# Patient Record
Sex: MALE
[De-identification: names, ages, dates, MRNs, and addresses within clinical notes are randomized per-mention and may not be internally consistent; named-entity substitution may affect disease eponyms.]

---

## 2017-09-20 ENCOUNTER — APPOINTMENT (OUTPATIENT)
Dept: OTOLARYNGOLOGY | Facility: CLINIC | Age: 57
End: 2017-09-20
Payer: COMMERCIAL

## 2017-09-20 VITALS — WEIGHT: 199 LBS | BODY MASS INDEX: 30.16 KG/M2 | HEIGHT: 68 IN

## 2017-09-20 VITALS — SYSTOLIC BLOOD PRESSURE: 153 MMHG | HEART RATE: 81 BPM | OXYGEN SATURATION: 99 % | DIASTOLIC BLOOD PRESSURE: 108 MMHG

## 2017-09-20 DIAGNOSIS — Z87.891 PERSONAL HISTORY OF NICOTINE DEPENDENCE: ICD-10-CM

## 2017-09-20 DIAGNOSIS — R42 DIZZINESS AND GIDDINESS: ICD-10-CM

## 2017-09-20 PROBLEM — Z00.00 ENCOUNTER FOR PREVENTIVE HEALTH EXAMINATION: Status: ACTIVE | Noted: 2017-09-20

## 2017-09-20 PROCEDURE — 92550 TYMPANOMETRY & REFLEX THRESH: CPT

## 2017-09-20 PROCEDURE — 92557 COMPREHENSIVE HEARING TEST: CPT

## 2017-09-20 PROCEDURE — 99203 OFFICE O/P NEW LOW 30 MIN: CPT

## 2017-10-02 ENCOUNTER — APPOINTMENT (OUTPATIENT)
Dept: OTOLARYNGOLOGY | Facility: CLINIC | Age: 57
End: 2017-10-02
Payer: COMMERCIAL

## 2017-10-02 DIAGNOSIS — R42 DIZZINESS AND GIDDINESS: ICD-10-CM

## 2017-10-02 PROCEDURE — 92537 CALORIC VSTBLR TEST W/REC: CPT

## 2017-10-02 PROCEDURE — 92585: CPT

## 2017-10-02 PROCEDURE — 92584 ELECTROCOCHLEOGRAPHY: CPT

## 2017-10-02 PROCEDURE — 99213 OFFICE O/P EST LOW 20 MIN: CPT

## 2017-10-02 PROCEDURE — 92540 BASIC VESTIBULAR EVALUATION: CPT

## 2017-10-02 RX ORDER — MECLIZINE HYDROCHLORIDE 25 MG/1
25 TABLET ORAL
Qty: 60 | Refills: 2 | Status: ACTIVE | COMMUNITY
Start: 2017-10-02 | End: 1900-01-01

## 2025-03-31 ENCOUNTER — APPOINTMENT (OUTPATIENT)
Dept: HEART AND VASCULAR | Facility: CLINIC | Age: 65
End: 2025-03-31
Payer: COMMERCIAL

## 2025-03-31 ENCOUNTER — NON-APPOINTMENT (OUTPATIENT)
Age: 65
End: 2025-03-31

## 2025-03-31 VITALS
HEART RATE: 78 BPM | BODY MASS INDEX: 29.4 KG/M2 | SYSTOLIC BLOOD PRESSURE: 160 MMHG | HEIGHT: 68 IN | OXYGEN SATURATION: 97 % | DIASTOLIC BLOOD PRESSURE: 88 MMHG | WEIGHT: 194 LBS | TEMPERATURE: 97.2 F

## 2025-03-31 VITALS — SYSTOLIC BLOOD PRESSURE: 136 MMHG | DIASTOLIC BLOOD PRESSURE: 88 MMHG

## 2025-03-31 DIAGNOSIS — E78.49 OTHER HYPERLIPIDEMIA: ICD-10-CM

## 2025-03-31 DIAGNOSIS — R07.89 OTHER CHEST PAIN: ICD-10-CM

## 2025-03-31 DIAGNOSIS — I10 ESSENTIAL (PRIMARY) HYPERTENSION: ICD-10-CM

## 2025-03-31 DIAGNOSIS — R73.03 PREDIABETES.: ICD-10-CM

## 2025-03-31 DIAGNOSIS — R06.09 OTHER FORMS OF DYSPNEA: ICD-10-CM

## 2025-03-31 PROCEDURE — 93000 ELECTROCARDIOGRAM COMPLETE: CPT

## 2025-03-31 PROCEDURE — 99204 OFFICE O/P NEW MOD 45 MIN: CPT | Mod: 25

## 2025-03-31 RX ORDER — ESOMEPRAZOLE MAGNESIUM 40 MG/1
40 CAPSULE, DELAYED RELEASE ORAL
Refills: 0 | Status: ACTIVE | COMMUNITY

## 2025-03-31 RX ORDER — UBIDECARENONE/VIT E ACET 100MG-5
CAPSULE ORAL
Refills: 0 | Status: ACTIVE | COMMUNITY

## 2025-03-31 RX ORDER — ATORVASTATIN CALCIUM 20 MG/1
20 TABLET, FILM COATED ORAL
Refills: 0 | Status: ACTIVE | COMMUNITY

## 2025-03-31 RX ORDER — AMLODIPINE BESYLATE 5 MG/1
5 TABLET ORAL
Refills: 0 | Status: ACTIVE | COMMUNITY

## 2025-04-28 ENCOUNTER — RESULT REVIEW (OUTPATIENT)
Age: 65
End: 2025-04-28

## 2025-04-30 RX ORDER — CLOPIDOGREL BISULFATE 75 MG/1
75 TABLET, FILM COATED ORAL
Qty: 90 | Refills: 3 | Status: ACTIVE | COMMUNITY
Start: 2025-04-30 | End: 1900-01-01

## 2025-04-30 RX ORDER — ASPIRIN 81 MG/1
81 TABLET, DELAYED RELEASE ORAL
Qty: 90 | Refills: 3 | Status: ACTIVE | COMMUNITY
Start: 2025-04-30 | End: 1900-01-01

## 2025-05-01 ENCOUNTER — RESULT REVIEW (OUTPATIENT)
Age: 65
End: 2025-05-01

## 2025-05-02 VITALS
TEMPERATURE: 98 F | DIASTOLIC BLOOD PRESSURE: 80 MMHG | RESPIRATION RATE: 16 BRPM | SYSTOLIC BLOOD PRESSURE: 158 MMHG | WEIGHT: 190.92 LBS | OXYGEN SATURATION: 97 % | HEART RATE: 92 BPM | HEIGHT: 68 IN

## 2025-05-02 NOTE — H&P ADULT - HISTORY OF PRESENT ILLNESS
Cardiologist : Dr. Montez  Pharmacy :   Escort :    64 y o m former smoker with PMH of vertigo, HTN, HLD, prediabetes presented to his cardiologist c/o MAYER on mild exertion and CP at rest lasting seconds to minutes relived with rest with accompanied dizziness.   Pt denies syncope, palpitations, PND/orthopnea, LE edema, n/v, fever/chills, blood in the stool.   CCTA done on 4/30/25 showed Calcium score is severe at 741 A, probably severe stenosis in mRCA, moderate stenosis in prox LAD and OM1, mild to moderate stenosis in mLcx.   In light of pt's risk factors, CCS 3-4 anginal symptoms and abnormal CCTA, pt is now referred to [facility] for recommended cardiac cath with possible intervention.      Cardiologist : Dr. Montez  Pharmacy : [facility]   Escort : Son in Law    65 yo M, former heavy smoker, with a PMH HTN, HLD, prediabetes, GERd and vertigo who presented to his OP cardiologist with c/o an intermittent L sided chest pain at rest with radiation up the L neck (numbness) and b/l 1st/2nd digit numbness, worsening over the past year. Additionally endorses palpitations. Denies SOB, dizziness, diaphoresis, orthopnea/PND, BRBPR, hematuria, melena, LE swelling. Pt decided to have symptoms evaluatied after son recently  last year of cardiac arrest (unknown origin - was in 40s). CCTA 25: Calcium score is severe at 741 A, probably severe stenosis in mRCA, moderate stenosis in prox LAD and OM1, mild to moderate stenosis in mLcx. In light of pt's risk factors, CCS IV anginal symptoms and abnormal CCTA, pt is now referred to [facility] for recommended cardiac cath with possible intervention.

## 2025-05-02 NOTE — H&P ADULT - NS ATTEND AMEND GEN_ALL_CORE FT
63 yo M, former heavy smoker, with a PMH HTN, HLD, prediabetes, GERd and vertigo who presented to his OP cardiologist with c/o an intermittent L sided chest pain at rest with radiation up the L neck (numbness) and b/l 1st/2nd digit numbness, worsening over the past year. Additionally endorses palpitations. Denies SOB, dizziness, diaphoresis, orthopnea/PND, BRBPR, hematuria, melena, LE swelling. Pt decided to have symptoms evaluatied after son recently  last year of cardiac arrest (unknown origin - was in 40s). CCTA 25: Calcium score is severe at 741 A, probably severe stenosis in mRCA, moderate stenosis in prox LAD and OM1, mild to moderate stenosis in mLcx. In light of pt's risk factors, CCS IV anginal symptoms and abnormal CCTA, pt is now referred to [facility] for recommended cardiac cath with possible intervention.

## 2025-05-02 NOTE — H&P ADULT - NSICDXPASTSURGICALHX_GEN_ALL_CORE_FT
PAST SURGICAL HISTORY:  History of back surgery      PAST SURGICAL HISTORY:  History of appendectomy     History of back surgery

## 2025-05-02 NOTE — H&P ADULT - ASSESSMENT
63 yo M, former heavy smoker, with a PMH HTN, HLD, prediabetes, GERd and vertigo who presented to his OP cardiologist with c/o an intermittent L sided chest pain at rest with radiation up the L neck (numbness) and b/l 1st/2nd digit numbness, worsening over the past year. Additionally endorses palpitations. Denies SOB, dizziness, diaphoresis, orthopnea/PND, BRBPR, hematuria, melena, LE swelling. Pt decided to have symptoms evaluatied after son recently  last year of cardiac arrest (unknown origin - was in 40s). CCTA 25: Calcium score is severe at 741 A, probably severe stenosis in mRCA, moderate stenosis in prox LAD and OM1, mild to moderate stenosis in mLcx. In light of pt's risk factors, CCS IV anginal symptoms and abnormal CCTA, pt is now referred to [facility] for recommended cardiac cath with possible intervention.     -H/H (IStat) stable. Pt denies BRBPR, hematuria, hematochezia, melena. Pt compliant with home ASA/Plavix and took today. No additional load  -Cr (IStat) 1.07.  EF unknown. Euvolemic on exam. IVF with 250cc bolus x1 followed by NS @ 75cc/hr started pre procedure per protocol  -Type of sedation: moderate  -Candidate for sedation: yes     Risks & benefits of procedure and alternative therapy have been explained to the patient including but not limited to: allergic reaction, bleeding w/possible need for blood transfusion, infection, renal and vascular compromise, limb damage, arrhythmia, stroke, vessel dissection/perforation, Myocardial infarction, emergent CABG. Informed consent obtained and in chart.

## 2025-05-06 ENCOUNTER — INPATIENT (INPATIENT)
Facility: HOSPITAL | Age: 65
LOS: 0 days | Discharge: HOME | End: 2025-05-07
Attending: INTERNAL MEDICINE | Admitting: INTERNAL MEDICINE
Payer: COMMERCIAL

## 2025-05-06 DIAGNOSIS — Z98.890 OTHER SPECIFIED POSTPROCEDURAL STATES: Chronic | ICD-10-CM

## 2025-05-06 LAB
A1C WITH ESTIMATED AVERAGE GLUCOSE RESULT: 6.2 % — HIGH (ref 4–5.6)
ADD ON TEST-SPECIMEN IN LAB: SIGNIFICANT CHANGE UP
APTT BLD: 28.4 SEC — SIGNIFICANT CHANGE UP (ref 26.1–36.8)
BASOPHILS # BLD AUTO: 0.07 K/UL — SIGNIFICANT CHANGE UP (ref 0–0.2)
BASOPHILS NFR BLD AUTO: 0.9 % — SIGNIFICANT CHANGE UP (ref 0–2)
CK MB CFR SERPL CALC: 2.9 NG/ML — SIGNIFICANT CHANGE UP (ref 0–6.7)
CK SERPL-CCNC: 145 U/L — SIGNIFICANT CHANGE UP (ref 30–200)
EOSINOPHIL # BLD AUTO: 0.35 K/UL — SIGNIFICANT CHANGE UP (ref 0–0.5)
EOSINOPHIL NFR BLD AUTO: 4.4 % — SIGNIFICANT CHANGE UP (ref 0–6)
ESTIMATED AVERAGE GLUCOSE: 131 MG/DL — HIGH (ref 68–114)
HCT VFR BLD CALC: 45.8 % — SIGNIFICANT CHANGE UP (ref 39–50)
HGB BLD-MCNC: 15.5 G/DL — SIGNIFICANT CHANGE UP (ref 13–17)
IMM GRANULOCYTES NFR BLD AUTO: 1.4 % — HIGH (ref 0–0.9)
INR BLD: 0.96 — SIGNIFICANT CHANGE UP (ref 0.85–1.16)
LYMPHOCYTES # BLD AUTO: 3 K/UL — SIGNIFICANT CHANGE UP (ref 1–3.3)
LYMPHOCYTES # BLD AUTO: 37.5 % — SIGNIFICANT CHANGE UP (ref 13–44)
MCHC RBC-ENTMCNC: 30.6 PG — SIGNIFICANT CHANGE UP (ref 27–34)
MCHC RBC-ENTMCNC: 33.8 G/DL — SIGNIFICANT CHANGE UP (ref 32–36)
MCV RBC AUTO: 90.3 FL — SIGNIFICANT CHANGE UP (ref 80–100)
MONOCYTES # BLD AUTO: 0.69 K/UL — SIGNIFICANT CHANGE UP (ref 0–0.9)
MONOCYTES NFR BLD AUTO: 8.6 % — SIGNIFICANT CHANGE UP (ref 2–14)
NEUTROPHILS # BLD AUTO: 3.79 K/UL — SIGNIFICANT CHANGE UP (ref 1.8–7.4)
NEUTROPHILS NFR BLD AUTO: 47.2 % — SIGNIFICANT CHANGE UP (ref 43–77)
NRBC BLD AUTO-RTO: 0 /100 WBCS — SIGNIFICANT CHANGE UP (ref 0–0)
PLATELET # BLD AUTO: 209 K/UL — SIGNIFICANT CHANGE UP (ref 150–400)
PROTHROM AB SERPL-ACNC: 11.1 SEC — SIGNIFICANT CHANGE UP (ref 9.9–13.4)
RBC # BLD: 5.07 M/UL — SIGNIFICANT CHANGE UP (ref 4.2–5.8)
RBC # FLD: 13.2 % — SIGNIFICANT CHANGE UP (ref 10.3–14.5)
WBC # BLD: 8.01 K/UL — SIGNIFICANT CHANGE UP (ref 3.8–10.5)
WBC # FLD AUTO: 8.01 K/UL — SIGNIFICANT CHANGE UP (ref 3.8–10.5)

## 2025-05-06 PROCEDURE — 92928 PRQ TCAT PLMT NTRAC ST 1 LES: CPT | Mod: RC

## 2025-05-06 PROCEDURE — 93458 L HRT ARTERY/VENTRICLE ANGIO: CPT | Mod: 26,59

## 2025-05-06 PROCEDURE — 93010 ELECTROCARDIOGRAM REPORT: CPT | Mod: 76

## 2025-05-06 PROCEDURE — 99152 MOD SED SAME PHYS/QHP 5/>YRS: CPT

## 2025-05-06 PROCEDURE — 92978 ENDOLUMINL IVUS OCT C 1ST: CPT | Mod: 26,RC

## 2025-05-06 RX ORDER — ESOMEPRAZOLE MAGNESIUM 40 MG/1
1 CAPSULE, DELAYED RELEASE ORAL
Refills: 0 | DISCHARGE

## 2025-05-06 RX ORDER — AMLODIPINE BESYLATE 10 MG/1
5 TABLET ORAL DAILY
Refills: 0 | Status: DISCONTINUED | OUTPATIENT
Start: 2025-05-06 | End: 2025-05-07

## 2025-05-06 RX ORDER — CLOPIDOGREL BISULFATE 75 MG/1
600 TABLET, FILM COATED ORAL ONCE
Refills: 0 | Status: DISCONTINUED | OUTPATIENT
Start: 2025-05-06 | End: 2025-05-06

## 2025-05-06 RX ORDER — MECLIZINE HCL 12.5 MG
1 TABLET ORAL
Refills: 0 | DISCHARGE

## 2025-05-06 RX ORDER — AMLODIPINE BESYLATE 10 MG/1
1 TABLET ORAL
Refills: 0 | DISCHARGE

## 2025-05-06 RX ORDER — CLOPIDOGREL BISULFATE 75 MG/1
75 TABLET, FILM COATED ORAL DAILY
Refills: 0 | Status: DISCONTINUED | OUTPATIENT
Start: 2025-05-07 | End: 2025-05-07

## 2025-05-06 RX ORDER — ASPIRIN 325 MG
81 TABLET ORAL DAILY
Refills: 0 | Status: DISCONTINUED | OUTPATIENT
Start: 2025-05-07 | End: 2025-05-07

## 2025-05-06 RX ORDER — ATORVASTATIN CALCIUM 80 MG/1
40 TABLET, FILM COATED ORAL AT BEDTIME
Refills: 0 | Status: DISCONTINUED | OUTPATIENT
Start: 2025-05-06 | End: 2025-05-07

## 2025-05-06 RX ORDER — ASPIRIN 325 MG
325 TABLET ORAL ONCE
Refills: 0 | Status: DISCONTINUED | OUTPATIENT
Start: 2025-05-06 | End: 2025-05-06

## 2025-05-06 RX ADMIN — Medication 75 MILLILITER(S): at 07:52

## 2025-05-06 RX ADMIN — ATORVASTATIN CALCIUM 40 MILLIGRAM(S): 80 TABLET, FILM COATED ORAL at 19:44

## 2025-05-06 RX ADMIN — AMLODIPINE BESYLATE 5 MILLIGRAM(S): 10 TABLET ORAL at 19:39

## 2025-05-06 RX ADMIN — Medication 250 MILLILITER(S): at 07:52

## 2025-05-06 NOTE — PATIENT PROFILE ADULT - FALL HARM RISK - HARM RISK INTERVENTIONS

## 2025-05-07 ENCOUNTER — TRANSCRIPTION ENCOUNTER (OUTPATIENT)
Age: 65
End: 2025-05-07

## 2025-05-07 ENCOUNTER — NON-APPOINTMENT (OUTPATIENT)
Age: 65
End: 2025-05-07

## 2025-05-07 VITALS
RESPIRATION RATE: 18 BRPM | TEMPERATURE: 99 F | SYSTOLIC BLOOD PRESSURE: 131 MMHG | HEART RATE: 78 BPM | DIASTOLIC BLOOD PRESSURE: 86 MMHG | OXYGEN SATURATION: 99 %

## 2025-05-07 PROBLEM — R42 DIZZINESS AND GIDDINESS: Chronic | Status: ACTIVE | Noted: 2025-05-02

## 2025-05-07 PROBLEM — I10 ESSENTIAL (PRIMARY) HYPERTENSION: Chronic | Status: ACTIVE | Noted: 2025-05-02

## 2025-05-07 LAB
ANION GAP SERPL CALC-SCNC: 12 MMOL/L — SIGNIFICANT CHANGE UP (ref 5–17)
BUN SERPL-MCNC: 14 MG/DL — SIGNIFICANT CHANGE UP (ref 7–23)
CALCIUM SERPL-MCNC: 10 MG/DL — SIGNIFICANT CHANGE UP (ref 8.4–10.5)
CHLORIDE SERPL-SCNC: 104 MMOL/L — SIGNIFICANT CHANGE UP (ref 96–108)
CO2 SERPL-SCNC: 21 MMOL/L — LOW (ref 22–31)
CREAT SERPL-MCNC: 0.97 MG/DL — SIGNIFICANT CHANGE UP (ref 0.5–1.3)
EGFR: 86 ML/MIN/1.73M2 — SIGNIFICANT CHANGE UP
EGFR: 86 ML/MIN/1.73M2 — SIGNIFICANT CHANGE UP
GLUCOSE SERPL-MCNC: 125 MG/DL — HIGH (ref 70–99)
HCT VFR BLD CALC: 43.9 % — SIGNIFICANT CHANGE UP (ref 39–50)
HGB BLD-MCNC: 15.2 G/DL — SIGNIFICANT CHANGE UP (ref 13–17)
MAGNESIUM SERPL-MCNC: 2.2 MG/DL — SIGNIFICANT CHANGE UP (ref 1.6–2.6)
MCHC RBC-ENTMCNC: 30.9 PG — SIGNIFICANT CHANGE UP (ref 27–34)
MCHC RBC-ENTMCNC: 34.6 G/DL — SIGNIFICANT CHANGE UP (ref 32–36)
MCV RBC AUTO: 89.2 FL — SIGNIFICANT CHANGE UP (ref 80–100)
NRBC BLD AUTO-RTO: 0 /100 WBCS — SIGNIFICANT CHANGE UP (ref 0–0)
PLATELET # BLD AUTO: 188 K/UL — SIGNIFICANT CHANGE UP (ref 150–400)
POTASSIUM SERPL-MCNC: 4.1 MMOL/L — SIGNIFICANT CHANGE UP (ref 3.5–5.3)
POTASSIUM SERPL-SCNC: 4.1 MMOL/L — SIGNIFICANT CHANGE UP (ref 3.5–5.3)
RBC # BLD: 4.92 M/UL — SIGNIFICANT CHANGE UP (ref 4.2–5.8)
RBC # FLD: 13 % — SIGNIFICANT CHANGE UP (ref 10.3–14.5)
SODIUM SERPL-SCNC: 137 MMOL/L — SIGNIFICANT CHANGE UP (ref 135–145)
WBC # BLD: 8.43 K/UL — SIGNIFICANT CHANGE UP (ref 3.8–10.5)
WBC # FLD AUTO: 8.43 K/UL — SIGNIFICANT CHANGE UP (ref 3.8–10.5)

## 2025-05-07 PROCEDURE — 82553 CREATINE MB FRACTION: CPT

## 2025-05-07 PROCEDURE — 83036 HEMOGLOBIN GLYCOSYLATED A1C: CPT

## 2025-05-07 PROCEDURE — C1769: CPT

## 2025-05-07 PROCEDURE — 85730 THROMBOPLASTIN TIME PARTIAL: CPT

## 2025-05-07 PROCEDURE — C1874: CPT

## 2025-05-07 PROCEDURE — 82803 BLOOD GASES ANY COMBINATION: CPT

## 2025-05-07 PROCEDURE — C1887: CPT

## 2025-05-07 PROCEDURE — 83735 ASSAY OF MAGNESIUM: CPT

## 2025-05-07 PROCEDURE — C1894: CPT

## 2025-05-07 PROCEDURE — C1725: CPT

## 2025-05-07 PROCEDURE — 85027 COMPLETE CBC AUTOMATED: CPT

## 2025-05-07 PROCEDURE — 99239 HOSP IP/OBS DSCHRG MGMT >30: CPT

## 2025-05-07 PROCEDURE — 85610 PROTHROMBIN TIME: CPT

## 2025-05-07 PROCEDURE — C1753: CPT

## 2025-05-07 PROCEDURE — 93010 ELECTROCARDIOGRAM REPORT: CPT

## 2025-05-07 PROCEDURE — 80061 LIPID PANEL: CPT

## 2025-05-07 PROCEDURE — 85025 COMPLETE CBC W/AUTO DIFF WBC: CPT

## 2025-05-07 PROCEDURE — 80048 BASIC METABOLIC PNL TOTAL CA: CPT

## 2025-05-07 PROCEDURE — 82550 ASSAY OF CK (CPK): CPT

## 2025-05-07 PROCEDURE — 80053 COMPREHEN METABOLIC PANEL: CPT

## 2025-05-07 PROCEDURE — 85347 COAGULATION TIME ACTIVATED: CPT

## 2025-05-07 PROCEDURE — 36415 COLL VENOUS BLD VENIPUNCTURE: CPT

## 2025-05-07 PROCEDURE — C1760: CPT

## 2025-05-07 PROCEDURE — 93005 ELECTROCARDIOGRAM TRACING: CPT

## 2025-05-07 RX ORDER — CLOPIDOGREL BISULFATE 75 MG/1
1 TABLET, FILM COATED ORAL
Refills: 0 | DISCHARGE

## 2025-05-07 RX ORDER — ASPIRIN 325 MG
1 TABLET ORAL
Refills: 0 | DISCHARGE

## 2025-05-07 RX ORDER — ASPIRIN 325 MG
1 TABLET ORAL
Qty: 30 | Refills: 11
Start: 2025-05-07 | End: 2026-05-01

## 2025-05-07 RX ORDER — ATORVASTATIN CALCIUM 80 MG/1
1 TABLET, FILM COATED ORAL
Refills: 0 | DISCHARGE

## 2025-05-07 RX ORDER — ATORVASTATIN CALCIUM 80 MG/1
1 TABLET, FILM COATED ORAL
Qty: 30 | Refills: 3
Start: 2025-05-07 | End: 2025-09-03

## 2025-05-07 RX ORDER — CLOPIDOGREL BISULFATE 75 MG/1
1 TABLET, FILM COATED ORAL
Qty: 30 | Refills: 11
Start: 2025-05-07 | End: 2026-05-01

## 2025-05-07 RX ADMIN — Medication 81 MILLIGRAM(S): at 12:01

## 2025-05-07 RX ADMIN — Medication 40 MILLIGRAM(S): at 05:37

## 2025-05-07 RX ADMIN — CLOPIDOGREL BISULFATE 75 MILLIGRAM(S): 75 TABLET, FILM COATED ORAL at 12:01

## 2025-05-07 NOTE — DISCHARGE NOTE PROVIDER - NSDCMRMEDTOKEN_GEN_ALL_CORE_FT
aspirin 81 mg oral tablet: 1 tab(s) orally once a day  atorvastatin 20 mg oral tablet: 1 tab(s) orally once a day  NexIUM 40 mg oral delayed release capsule: 1 cap(s) orally once a day  Norvasc 5 mg oral tablet: 1 tab(s) orally once a day  Plavix 75 mg oral tablet: 1 tab(s) orally once a day   aspirin 81 mg oral delayed release tablet: 1 tab(s) orally once a day  atorvastatin 20 mg oral tablet: 1 tab(s) orally once a day  Cardiac Rehab: Cardiac Rehab 3-4x/week x12 weeks Dr. Montez dx: CAD  NexIUM 40 mg oral delayed release capsule: 1 cap(s) orally once a day  Norvasc 5 mg oral tablet: 1 tab(s) orally once a day  Plavix 75 mg oral tablet: 1 tab(s) orally once a day   aspirin 81 mg oral delayed release tablet: 1 tab(s) orally once a day  atorvastatin 20 mg oral tablet: 1 tab(s) orally once a day  NexIUM 40 mg oral delayed release capsule: 1 cap(s) orally once a day  Norvasc 5 mg oral tablet: 1 tab(s) orally once a day  Plavix 75 mg oral tablet: 1 tab(s) orally once a day

## 2025-05-07 NOTE — DISCHARGE NOTE PROVIDER - ATTENDING DISCHARGE PHYSICAL EXAMINATION:
CAD  S/p Cardiac Catheterization (5/6/25): R dominant, LM mild diffuse, pLAD 30% Ca++ diffuse, dLAD 40%, mLCx 75%, OM1 mild diffuse disease, BRADEN x 1 pRCA 95%. LVEDP 5 mmHg. LVEF 60%.    Stage LCx in 5 weeks.  Aspirin 81mg daily + Clopidogrel 75mg daily

## 2025-05-07 NOTE — DISCHARGE NOTE PROVIDER - HOSPITAL COURSE
65 yo M, former heavy smoker, with a PMH HTN, HLD, prediabetes, GERd and vertigo who presented to his OP cardiologist with c/o an intermittent L sided chest pain at rest with radiation up the L neck (numbness) and b/l 1st/2nd digit numbness, worsening over the past year. Additionally endorses palpitations. Pt presented to [facility] for LHC.    Pt now s/p Cardiac Catheterization with (5/6/25): R dominant, LM mild diffuse, pLAD 30% Ca++ diffuse, dLAD 40%, mLCx 75%, OM1 mild diffuse disease, BRADEN x 1 pRCA 95%. LVEDP 5 mmHg. LVEF 60%.  Right radial access w/ TR band. RFA perclose. Plan to stage LCx in 5 weeks.    DAPT: Aspirin 81mg daily + Clopidogrel 75mg daily  Access: Right radial TR band due at 1p, Right femoral artery perclose bedrest until 1p  Statin: Lipitor 40mg daily (increase from 20mg daily)  IVF: 200cc/hr x 8hr  PPI: Pantoprazole 40mg daily    65 yo M, former heavy smoker, with a PMH HTN, HLD, prediabetes, GERd and vertigo who presented to his OP cardiologist with c/o an intermittent L sided chest pain at rest with radiation up the L neck (numbness) and b/l 1st/2nd digit numbness, worsening over the past year. Additionally endorses palpitations. Pt presented to [facility] for LHC.    Pt now s/p Cardiac Catheterization (5/6/25): R dominant, LM mild diffuse, pLAD 30% Ca++ diffuse, dLAD 40%, mLCx 75%, OM1 mild diffuse disease, BRADEN x 1 pRCA 95%. LVEDP 5 mmHg. LVEF 60%.  Right radial access w/ TR band. RFA perclose. Plan to stage LCx in 5 weeks.    Performed by Dr. Montez and Dr. Kirby   DAPT: Aspirin 81mg daily + Clopidogrel 75mg daily  Access: Right radial TR band due at 1p, Right femoral artery perclose bedrest until 1p  Statin: Lipitor 40mg daily (increase from 20mg daily)  IVF: 200cc/hr x 8hr  PPI: Pantoprazole 40mg daily     Pt seen and examined at bedside this AM without any complaints or events overnight, VSS, labs and telemetry reviewed and pt stable for discharge as discussed with Dr. Ramachandran. Pt has received appropriate discharge instructions, including medication regimen, access site management and follow up with Dr. Montez in 5/14/25.    Discharge medications: ASA 81mg QD, Plavix 75mg QD, Amlodipine 5mg QD. Atorvastatin 40 mg QD, Pantoprazole 40mg QD.

## 2025-05-07 NOTE — DISCHARGE NOTE PROVIDER - NSDCCPCAREPLAN_GEN_ALL_CORE_FT
PRINCIPAL DISCHARGE DIAGNOSIS  Diagnosis: CAD (coronary artery disease)  Assessment and Plan of Treatment: You were found to have blockages in the arteries of your heart, also known as Coronary Artery Disease. You underwent a cardiac catheterization on 5/7/25 and received a stent to the proximal right coronary artery. You will come back for another stent placement in 5 weeks.  - PLEASE CONTINUE ASPIRIN 81MG DAILY AND PLAVIX 75MG DAILY. DO NOT STOP THESE MEDICATIONS FOR ANY REASON AS THEY ARE KEEPING YOUR STENT OPEN AND PREVENTING A HEART ATTACK.   - Avoid strenuous activity or heavy lifting anything more than 5lbs for the next five days. Do not take a bath or swim for the next five days; you may shower. For any bleeding or hematoma formation (hardened blood collection under the skin) at the access site of your Right groin and right wrist please hold pressure and go to the emergency room.   - Please follow up with Dr. Montez in 5/14/25. For recurrent chest pain, please call your doctor or go to the emergency room.  ** We have provided you with a prescription for cardiac rehab which is medically supervised exercise program for your heart and has been shown to improve the quantity and quality of life of people with heart disease like yours. You should attend cardiac rehab 3 times per week for 12 weeks. We have provided you with a list of nearby facilities. Please call your insurance carrier to determine which of these facilities are covered under your plan. Please bring this prescription with you to your follow up appointment with your cardiologist who can then further assist you to enroll into a cardiac rehab program.        SECONDARY DISCHARGE DIAGNOSES  Diagnosis: HTN (hypertension)  Assessment and Plan of Treatment: Please continue AMLODIPINE 5MG PO as listed to keep your blood pressure controlled. For blood pressure that is too high or too low please see your doctor or go to the emergency room as necessary.      Diagnosis: HLD (hyperlipidemia)  Assessment and Plan of Treatment: Please continue ATORVASTATIN 40 MG PO at bedtime to keep your cholesterol low. High cholesterol contributes to heart disease.       PRINCIPAL DISCHARGE DIAGNOSIS  Diagnosis: CAD (coronary artery disease)  Assessment and Plan of Treatment: You were found to have blockages in the arteries of your heart, also known as Coronary Artery Disease. You underwent a cardiac catheterization on 5/7/25 and received a stent to the proximal right coronary artery. You will come back for another stent placement in 5 weeks.  - PLEASE CONTINUE ASPIRIN 81MG DAILY AND PLAVIX 75MG DAILY. DO NOT STOP THESE MEDICATIONS FOR ANY REASON AS THEY ARE KEEPING YOUR STENT OPEN AND PREVENTING A HEART ATTACK.   - Avoid strenuous activity or heavy lifting anything more than 5lbs for the next five days. Do not take a bath or swim for the next five days; you may shower. For any bleeding or hematoma formation (hardened blood collection under the skin) at the access site of your Right groin and right wrist please hold pressure and go to the emergency room.   - Please follow up with Dr. Montez in 5/14/25. For recurrent chest pain, please call your doctor or go to the emergency room.        SECONDARY DISCHARGE DIAGNOSES  Diagnosis: HTN (hypertension)  Assessment and Plan of Treatment: Please continue AMLODIPINE 5MG PO as listed to keep your blood pressure controlled. For blood pressure that is too high or too low please see your doctor or go to the emergency room as necessary.      Diagnosis: HLD (hyperlipidemia)  Assessment and Plan of Treatment: Please continue ATORVASTATIN 40 MG PO at bedtime to keep your cholesterol low. High cholesterol contributes to heart disease.

## 2025-05-07 NOTE — DISCHARGE NOTE PROVIDER - NSDCFUSCHEDAPPT_GEN_ALL_CORE_FT
[facility]  HEARTVASC [address]  Scheduled Appointment: 06/18/2025    Kodak Montez  [facility]  HEARTVASC [address]  Scheduled Appointment: 06/18/2025     Kodak Montez  [facility]  HEARTVASC [address]  Scheduled Appointment: 05/14/2025    [facility]  HEARTVASC [address]  Scheduled Appointment: 06/18/2025    Kodak Montez  [facility]  HEARTVASC [address]  Scheduled Appointment: 06/18/2025

## 2025-05-07 NOTE — DISCHARGE NOTE NURSING/CASE MANAGEMENT/SOCIAL WORK - PATIENT PORTAL LINK FT
You can access the FollowMyHealth Patient Portal offered by [facility] by registering at the following website: [email or web address]. By joining [facility]’s FollowMyHealth portal, you will also be able to view your health information using other applications (apps) compatible with our system.

## 2025-05-07 NOTE — DISCHARGE NOTE PROVIDER - CARE PROVIDER_API CALL
Kodak Montez  Interventional Cardiology  [address], [address]  [place] [address]  Phone: [phone]  Fax: [phone]  Scheduled Appointment: 05/14/2025   Kodak Montez  Interventional Cardiology  [address], [address]  [place] [address]  Phone: [phone]  Fax: [phone]  Established Patient  Scheduled Appointment: 05/14/2025 10:00 AM

## 2025-05-07 NOTE — DISCHARGE NOTE NURSING/CASE MANAGEMENT/SOCIAL WORK - FINANCIAL ASSISTANCE
[facility] provides services at a reduced cost to those who are determined to be eligible through [facility]’s financial assistance program. Information regarding [facility]’s financial assistance program can be found by going to [email or web address] or by calling [phone].

## 2025-05-07 NOTE — DISCHARGE NOTE PROVIDER - PROVIDER TOKENS
PROVIDER:[TOKEN:[[id number]],SCHEDULEDAPPT:[05/14/2025]] PROVIDER:[TOKEN:[[id number]],SCHEDULEDAPPT:[05/14/2025],SCHEDULEDAPPTTIME:[10:00 AM],ESTABLISHEDPATIENT:[T]]

## 2025-05-13 DIAGNOSIS — I25.10 ATHEROSCLEROTIC HEART DISEASE OF NATIVE CORONARY ARTERY WITHOUT ANGINA PECTORIS: ICD-10-CM

## 2025-05-13 DIAGNOSIS — I10 ESSENTIAL (PRIMARY) HYPERTENSION: ICD-10-CM

## 2025-05-13 DIAGNOSIS — Z87.891 PERSONAL HISTORY OF NICOTINE DEPENDENCE: ICD-10-CM

## 2025-05-13 DIAGNOSIS — E78.5 HYPERLIPIDEMIA, UNSPECIFIED: ICD-10-CM

## 2025-05-13 DIAGNOSIS — K21.9 GASTRO-ESOPHAGEAL REFLUX DISEASE WITHOUT ESOPHAGITIS: ICD-10-CM

## 2025-05-13 DIAGNOSIS — Z79.82 LONG TERM (CURRENT) USE OF ASPIRIN: ICD-10-CM

## 2025-05-13 DIAGNOSIS — R73.03 PREDIABETES: ICD-10-CM

## 2025-05-13 DIAGNOSIS — Z79.02 LONG TERM (CURRENT) USE OF ANTITHROMBOTICS/ANTIPLATELETS: ICD-10-CM

## 2025-05-14 ENCOUNTER — APPOINTMENT (OUTPATIENT)
Dept: HEART AND VASCULAR | Facility: CLINIC | Age: 65
End: 2025-05-14
Payer: COMMERCIAL

## 2025-05-14 VITALS
SYSTOLIC BLOOD PRESSURE: 138 MMHG | DIASTOLIC BLOOD PRESSURE: 84 MMHG | TEMPERATURE: 97.3 F | BODY MASS INDEX: 29.4 KG/M2 | HEART RATE: 89 BPM | WEIGHT: 194 LBS | HEIGHT: 68 IN | OXYGEN SATURATION: 98 %

## 2025-05-14 DIAGNOSIS — I25.119 ATHEROSCLEROTIC HEART DISEASE OF NATIVE CORONARY ARTERY WITH UNSPECIFIED ANGINA PECTORIS: ICD-10-CM

## 2025-05-14 DIAGNOSIS — I10 ESSENTIAL (PRIMARY) HYPERTENSION: ICD-10-CM

## 2025-05-14 DIAGNOSIS — R06.09 OTHER FORMS OF DYSPNEA: ICD-10-CM

## 2025-05-14 PROCEDURE — 93000 ELECTROCARDIOGRAM COMPLETE: CPT

## 2025-05-14 PROCEDURE — 99214 OFFICE O/P EST MOD 30 MIN: CPT | Mod: 25

## 2025-05-28 PROBLEM — E78.5 HYPERLIPIDEMIA, UNSPECIFIED: Chronic | Status: ACTIVE | Noted: 2025-05-02

## 2025-05-30 ENCOUNTER — NON-APPOINTMENT (OUTPATIENT)
Age: 65
End: 2025-05-30

## 2025-06-16 ENCOUNTER — NON-APPOINTMENT (OUTPATIENT)
Age: 65
End: 2025-06-16

## 2025-06-18 ENCOUNTER — APPOINTMENT (OUTPATIENT)
Dept: HEART AND VASCULAR | Facility: CLINIC | Age: 65
End: 2025-06-18
Payer: COMMERCIAL

## 2025-06-18 VITALS
BODY MASS INDEX: 29.4 KG/M2 | HEIGHT: 68 IN | DIASTOLIC BLOOD PRESSURE: 79 MMHG | HEART RATE: 87 BPM | TEMPERATURE: 97.4 F | WEIGHT: 194 LBS | SYSTOLIC BLOOD PRESSURE: 146 MMHG | OXYGEN SATURATION: 98 %

## 2025-06-18 PROCEDURE — 99214 OFFICE O/P EST MOD 30 MIN: CPT | Mod: 25

## 2025-06-18 PROCEDURE — 93000 ELECTROCARDIOGRAM COMPLETE: CPT

## 2025-06-18 PROCEDURE — 93306 TTE W/DOPPLER COMPLETE: CPT

## 2025-06-18 RX ORDER — ROSUVASTATIN CALCIUM 40 MG/1
40 TABLET, FILM COATED ORAL
Qty: 90 | Refills: 3 | Status: ACTIVE | COMMUNITY
Start: 2025-06-18 | End: 1900-01-01

## 2025-06-24 VITALS
RESPIRATION RATE: 16 BRPM | SYSTOLIC BLOOD PRESSURE: 147 MMHG | HEIGHT: 68 IN | TEMPERATURE: 97 F | OXYGEN SATURATION: 95 % | DIASTOLIC BLOOD PRESSURE: 81 MMHG | HEART RATE: 83 BPM | WEIGHT: 194.01 LBS

## 2025-06-24 RX ORDER — CLOPIDOGREL BISULFATE 75 MG/1
75 TABLET, FILM COATED ORAL DAILY
Refills: 0 | Status: DISCONTINUED | OUTPATIENT
Start: 2025-07-15 | End: 2025-07-29

## 2025-06-24 RX ORDER — ASPIRIN 325 MG
81 TABLET ORAL DAILY
Refills: 0 | Status: DISCONTINUED | OUTPATIENT
Start: 2025-07-15 | End: 2025-07-29

## 2025-06-24 NOTE — H&P ADULT - HISTORY OF PRESENT ILLNESS
Cardiologist: Dr. Montez   Pharmacy: [facility]   Escort:     63 yo M, former heavy smoker, with FHx of CAD (son passed from cardiac arrest), PMHx of HTN, HLD, CAD (s/p BRADEN pRCA 5/6/25 @ [facility], residual disease mLCx). prediabetes, GERD and vertigo who initially presented to his OP cardiologist with c/o an intermittent L sided chest pain at rest with radiation up the L neck (numbness) and b/l 1st/2nd digit numbness, worsening over the past year as well as palpitations. Pt denies fever, chills, cough, SOB, PND/orthopnea. LE edema, abdominal pain, N/V/D, dizziness. syncope. Pt underwent cardiac cath 5/6/25 resulting in BRADEN pRCA (95%), residual mLCx (75%) disease (full report below). Since procedure pt reports ___. Reports compliance with DAPT. In light of pts risk factors, CCS class III anginal symptoms known residual disease, pt now presents to [facility] for staged PCI of LCx.       Cath history:   Cardiac Catheterization (5/6/25 @ [facility]): R dominant, LM mild diffuse, pLAD 30% Ca++ diffuse, dLAD 40%, mLCx 75%, OM1 mild diffuse disease, BRADEN x 1 pRCA 95%. LVEDP 5 mmHg. LVEF 60%.  Right radial access w/ TR band. RFA perclose. Cardiologist: Dr. Montez   Pharmacy: [facility]   Escort:     64M, former heavy smoker, with FHx of CAD (son passed from cardiac arrest), PMHx of HTN, HLD, CAD (s/p BRADEN pRCA 5/6/25 @ [facility], residual disease mLCx). prediabetes, GERD and vertigo who initially presented to his OP cardiologist with c/o an intermittent L sided chest pain at rest with radiation up the L neck (numbness) and b/l 1st/2nd digit numbness, worsening over the past year as well as palpitations. Pt denies fever, chills, cough, SOB, PND/orthopnea. LE edema, abdominal pain, N/V/D, dizziness. syncope. Pt underwent cardiac cath 5/6/25 resulting in BRADEN pRCA (95%), residual mLCx (75%) disease (full report below). Since procedure pt reports ___. Reports compliance with DAPT. In light of pt's risk factors, CCS class III anginal symptoms known residual disease, pt now presents to [facility] for staged PCI of LCx.     Cath history:   Cardiac Catheterization (5/6/25 @ [facility]): R dominant, LM mild diffuse, pLAD 30% Ca++ diffuse, dLAD 40%, mLCx 75%, OM1 mild diffuse disease, BRADEN x 1 pRCA 95%. LVEDP 5 mmHg. LVEF 60%.  Right radial access w/ TR band. RFA perclose. Cardiologist: Dr. Montez   Pharmacy: [facility]   Escort: Daughter     65 yo M, former heavy smoker, with FHx of CAD (son passed from cardiac arrest), PMHx of HTN, HLD, CAD (s/p BRADEN pRCA 5/6/25 @ [facility], residual disease mLCx),  prediabetes, GERD and vertigo who initially presented to his OP cardiologist with c/o an intermittent L sided chest pain at rest with radiation up the L neck (numbness) and b/l 1st/2nd digit numbness, worsening over the past year as well as palpitations. Pt denies fever, chills, cough, SOB, PND/orthopnea. LE edema, abdominal pain, N/V/D, dizziness. syncope. Pt underwent cardiac cath 5/6/25 resulting in BRADEN pRCA (95%), residual mLCx (75%) disease (full report below). Since procedure pt reports improvement in chest pain however he continues to occasionally feel some chest discomfort. Reports compliance with DAPT. In light of pt's risk factors, CCS class III anginal symptoms known residual disease, pt now presents to [facility] for staged PCI of LCx.     Cath history:   Cardiac Catheterization (5/6/25 @ [facility]): R dominant, LM mild diffuse, pLAD 30% Ca++ diffuse, dLAD 40%, mLCx 75%, OM1 mild diffuse disease, BRADEN x 1 pRCA 95%. LVEDP 5 mmHg. LVEF 60%.  Right radial access w/ TR band. RFA perclose.

## 2025-06-24 NOTE — H&P ADULT - ASSESSMENT
65 yo M, former heavy smoker, with FHx of CAD (son passed from cardiac arrest), PMHx of HTN, HLD, CAD (s/p BRADEN pRCA 5/6/25 @ [facility], residual disease mLCx),  prediabetes, GERD and vertigo who in light of pt's risk factors, CCS class III anginal symptoms known residual disease, pt now presents to [facility] for staged PCI of LCx.     ASA class III, Mallampati II    Hgb/HCT: 15.6/47.4. Pt denies bleeding, melena, BRBPR, hematuria. Pt reports compliance with Aspirin 81mg daily, Plavix 75mg daily, last dose taken 7/14/25. Pt was ordered home doses prior to cath.    cc bolus followed by 75 cc/hr ordered. EF 60% by prior cath. Pt is euvolemic on exam, able to lie flat comfortably. Cr ___    Pt is a candidate for moderate sedation: Yes    Risks & benefits of procedure and alternative therapy have been explained to the patient including but not limited to: allergic reaction, bleeding w/possible need for blood transfusion, infection, renal and vascular compromise, limb damage, arrhythmia, stroke, vessel dissection/perforation, Myocardial infarction, emergent CABG. Informed consent obtained and in chart.

## 2025-06-24 NOTE — H&P ADULT - NSICDXPASTMEDICALHX_GEN_ALL_CORE_FT
PAST MEDICAL HISTORY:  CAD (coronary artery disease)     HLD (hyperlipidemia)     HTN (hypertension)     Vertigo

## 2025-06-24 NOTE — H&P ADULT - NSHPLABSRESULTS_GEN_ALL_CORE
ECG: NSR @ 80bpm, no STT changes                          15.6   5.89  )-----------( 270      ( 15 Jul 2025 07:52 )             47.4         Mg     2.2     07-15        PT/INR - ( 15 Jul 2025 07:52 )   PT: 11.2 sec;   INR: 0.96          PTT - ( 15 Jul 2025 07:52 )  PTT:29.5 sec

## 2025-07-09 ENCOUNTER — APPOINTMENT (OUTPATIENT)
Dept: HEART AND VASCULAR | Facility: CLINIC | Age: 65
End: 2025-07-09
Payer: COMMERCIAL

## 2025-07-09 ENCOUNTER — NON-APPOINTMENT (OUTPATIENT)
Age: 65
End: 2025-07-09

## 2025-07-09 VITALS
WEIGHT: 197 LBS | OXYGEN SATURATION: 97 % | SYSTOLIC BLOOD PRESSURE: 136 MMHG | BODY MASS INDEX: 29.86 KG/M2 | HEART RATE: 80 BPM | HEIGHT: 68 IN | DIASTOLIC BLOOD PRESSURE: 80 MMHG

## 2025-07-09 PROBLEM — I25.10 ATHEROSCLEROTIC HEART DISEASE OF NATIVE CORONARY ARTERY WITHOUT ANGINA PECTORIS: Chronic | Status: ACTIVE | Noted: 2025-06-24

## 2025-07-09 PROCEDURE — 93000 ELECTROCARDIOGRAM COMPLETE: CPT

## 2025-07-09 PROCEDURE — 99214 OFFICE O/P EST MOD 30 MIN: CPT | Mod: 25

## 2025-07-09 RX ORDER — ATORVASTATIN CALCIUM 40 MG/1
40 TABLET, FILM COATED ORAL
Refills: 0 | Status: ACTIVE | COMMUNITY

## 2025-07-10 LAB
ALBUMIN SERPL ELPH-MCNC: 4.8 G/DL
ALP BLD-CCNC: 69 U/L
ALT SERPL-CCNC: 69 U/L
ANION GAP SERPL CALC-SCNC: 14 MMOL/L
APTT BLD: 30.2 SEC
AST SERPL-CCNC: 46 U/L
BILIRUB SERPL-MCNC: 0.3 MG/DL
BUN SERPL-MCNC: 15 MG/DL
CALCIUM SERPL-MCNC: 9.8 MG/DL
CHLORIDE SERPL-SCNC: 106 MMOL/L
CO2 SERPL-SCNC: 21 MMOL/L
CREAT SERPL-MCNC: 1.13 MG/DL
EGFRCR SERPLBLD CKD-EPI 2021: 71 ML/MIN/1.73M2
GLUCOSE SERPL-MCNC: 108 MG/DL
HCT VFR BLD CALC: 46.3 %
HGB BLD-MCNC: 15.6 G/DL
INR PPP: 0.94 RATIO
MCHC RBC-ENTMCNC: 30.7 PG
MCHC RBC-ENTMCNC: 33.7 G/DL
MCV RBC AUTO: 91.1 FL
PLATELET # BLD AUTO: 267 K/UL
POTASSIUM SERPL-SCNC: 4.3 MMOL/L
PROT SERPL-MCNC: 7.6 G/DL
PT BLD: 11.1 SEC
RBC # BLD: 5.08 M/UL
RBC # FLD: 13.2 %
SODIUM SERPL-SCNC: 140 MMOL/L
WBC # FLD AUTO: 7.63 K/UL

## 2025-07-15 ENCOUNTER — OUTPATIENT (OUTPATIENT)
Dept: OUTPATIENT SERVICES | Facility: HOSPITAL | Age: 65
LOS: 1 days | End: 2025-07-15
Payer: COMMERCIAL

## 2025-07-15 DIAGNOSIS — Z98.890 OTHER SPECIFIED POSTPROCEDURAL STATES: Chronic | ICD-10-CM

## 2025-07-15 LAB
A1C WITH ESTIMATED AVERAGE GLUCOSE RESULT: 6 % — HIGH (ref 4–5.6)
ALBUMIN SERPL ELPH-MCNC: 4.8 G/DL — SIGNIFICANT CHANGE UP (ref 3.3–5)
ALP SERPL-CCNC: 65 U/L — SIGNIFICANT CHANGE UP (ref 40–120)
ALT FLD-CCNC: 69 U/L — HIGH (ref 10–45)
ANION GAP SERPL CALC-SCNC: 11 MMOL/L — SIGNIFICANT CHANGE UP (ref 5–17)
APTT BLD: 29.5 SEC — SIGNIFICANT CHANGE UP (ref 26.1–36.8)
AST SERPL-CCNC: 45 U/L — HIGH (ref 10–40)
BILIRUB SERPL-MCNC: 0.4 MG/DL — SIGNIFICANT CHANGE UP (ref 0.2–1.2)
BUN SERPL-MCNC: 14 MG/DL — SIGNIFICANT CHANGE UP (ref 7–23)
CALCIUM SERPL-MCNC: 9.9 MG/DL — SIGNIFICANT CHANGE UP (ref 8.4–10.5)
CHLORIDE SERPL-SCNC: 102 MMOL/L — SIGNIFICANT CHANGE UP (ref 96–108)
CHOLEST SERPL-MCNC: 161 MG/DL — SIGNIFICANT CHANGE UP
CK MB CFR SERPL CALC: 3.2 NG/ML — SIGNIFICANT CHANGE UP (ref 0–6.7)
CK SERPL-CCNC: 137 U/L — SIGNIFICANT CHANGE UP (ref 30–200)
CO2 SERPL-SCNC: 25 MMOL/L — SIGNIFICANT CHANGE UP (ref 22–31)
CREAT SERPL-MCNC: 1.04 MG/DL — SIGNIFICANT CHANGE UP (ref 0.5–1.3)
EGFR: 79 ML/MIN/1.73M2 — SIGNIFICANT CHANGE UP
EGFR: 79 ML/MIN/1.73M2 — SIGNIFICANT CHANGE UP
ESTIMATED AVERAGE GLUCOSE: 126 MG/DL — HIGH (ref 68–114)
GLUCOSE SERPL-MCNC: 132 MG/DL — HIGH (ref 70–99)
HCT VFR BLD CALC: 47.4 % — SIGNIFICANT CHANGE UP (ref 39–50)
HDLC SERPL-MCNC: 45 MG/DL — SIGNIFICANT CHANGE UP
HGB BLD-MCNC: 15.6 G/DL — SIGNIFICANT CHANGE UP (ref 13–17)
INR BLD: 0.96 — SIGNIFICANT CHANGE UP (ref 0.85–1.16)
LDLC SERPL-MCNC: 99 MG/DL — SIGNIFICANT CHANGE UP
LIPID PNL WITH DIRECT LDL SERPL: 99 MG/DL — SIGNIFICANT CHANGE UP
MAGNESIUM SERPL-MCNC: 2.2 MG/DL — SIGNIFICANT CHANGE UP (ref 1.6–2.6)
MCHC RBC-ENTMCNC: 29.8 PG — SIGNIFICANT CHANGE UP (ref 27–34)
MCHC RBC-ENTMCNC: 32.9 G/DL — SIGNIFICANT CHANGE UP (ref 32–36)
MCV RBC AUTO: 90.6 FL — SIGNIFICANT CHANGE UP (ref 80–100)
NONHDLC SERPL-MCNC: 116 MG/DL — SIGNIFICANT CHANGE UP
NRBC # BLD AUTO: 0 K/UL — SIGNIFICANT CHANGE UP (ref 0–0)
NRBC # FLD: 0 K/UL — SIGNIFICANT CHANGE UP (ref 0–0)
NRBC BLD AUTO-RTO: 0 /100 WBCS — SIGNIFICANT CHANGE UP (ref 0–0)
PLATELET # BLD AUTO: 270 K/UL — SIGNIFICANT CHANGE UP (ref 150–400)
PMV BLD: 9.1 FL — SIGNIFICANT CHANGE UP (ref 7–13)
POTASSIUM SERPL-MCNC: 4.4 MMOL/L — SIGNIFICANT CHANGE UP (ref 3.5–5.3)
POTASSIUM SERPL-SCNC: 4.4 MMOL/L — SIGNIFICANT CHANGE UP (ref 3.5–5.3)
PROT SERPL-MCNC: 7.8 G/DL — SIGNIFICANT CHANGE UP (ref 6–8.3)
PROTHROM AB SERPL-ACNC: 11.2 SEC — SIGNIFICANT CHANGE UP (ref 9.9–13.4)
RBC # BLD: 5.23 M/UL — SIGNIFICANT CHANGE UP (ref 4.2–5.8)
RBC # FLD: 12.6 % — SIGNIFICANT CHANGE UP (ref 10.3–14.5)
SODIUM SERPL-SCNC: 138 MMOL/L — SIGNIFICANT CHANGE UP (ref 135–145)
TRIGL SERPL-MCNC: 89 MG/DL — SIGNIFICANT CHANGE UP
WBC # BLD: 5.89 K/UL — SIGNIFICANT CHANGE UP (ref 3.8–10.5)
WBC # FLD AUTO: 5.89 K/UL — SIGNIFICANT CHANGE UP (ref 3.8–10.5)

## 2025-07-15 PROCEDURE — 93454 CORONARY ARTERY ANGIO S&I: CPT

## 2025-07-15 PROCEDURE — 93010 ELECTROCARDIOGRAM REPORT: CPT

## 2025-07-15 PROCEDURE — 83735 ASSAY OF MAGNESIUM: CPT

## 2025-07-15 PROCEDURE — 0523T NTRAPX C FFR W/3D FUNCJL MAP: CPT

## 2025-07-15 PROCEDURE — 80061 LIPID PANEL: CPT

## 2025-07-15 PROCEDURE — 85610 PROTHROMBIN TIME: CPT

## 2025-07-15 PROCEDURE — 36415 COLL VENOUS BLD VENIPUNCTURE: CPT

## 2025-07-15 PROCEDURE — 83036 HEMOGLOBIN GLYCOSYLATED A1C: CPT

## 2025-07-15 PROCEDURE — 80053 COMPREHEN METABOLIC PANEL: CPT

## 2025-07-15 PROCEDURE — 85027 COMPLETE CBC AUTOMATED: CPT

## 2025-07-15 PROCEDURE — 82553 CREATINE MB FRACTION: CPT

## 2025-07-15 PROCEDURE — 82550 ASSAY OF CK (CPK): CPT

## 2025-07-15 PROCEDURE — 93454 CORONARY ARTERY ANGIO S&I: CPT | Mod: 26

## 2025-07-15 PROCEDURE — 99152 MOD SED SAME PHYS/QHP 5/>YRS: CPT

## 2025-07-15 PROCEDURE — 85730 THROMBOPLASTIN TIME PARTIAL: CPT

## 2025-07-15 RX ORDER — ACETAMINOPHEN 500 MG/5ML
1000 LIQUID (ML) ORAL ONCE
Refills: 0 | Status: COMPLETED | OUTPATIENT
Start: 2025-07-15 | End: 2025-07-15

## 2025-07-15 RX ORDER — MECLIZINE HCL 12.5 MG
1 TABLET ORAL
Refills: 0 | DISCHARGE

## 2025-07-15 RX ADMIN — Medication 75 MILLILITER(S): at 08:40

## 2025-07-15 RX ADMIN — Medication 81 MILLIGRAM(S): at 11:25

## 2025-07-15 RX ADMIN — Medication 400 MILLIGRAM(S): at 11:25

## 2025-07-15 RX ADMIN — CLOPIDOGREL BISULFATE 75 MILLIGRAM(S): 75 TABLET, FILM COATED ORAL at 11:25

## 2025-07-15 RX ADMIN — Medication 1000 MILLIGRAM(S): at 11:25

## 2025-07-15 RX ADMIN — Medication 500 MILLILITER(S): at 08:40

## 2025-07-15 RX ADMIN — Medication 200 MILLILITER(S): at 11:22

## 2025-07-15 NOTE — PROGRESS NOTE ADULT - SUBJECTIVE AND OBJECTIVE BOX
Interventional Cardiology  Post  Diagnostic Catheterization  Discharge Note      Patient without complaints. Ambulated and voided without difficulties    Afebrile, VSS    Ext:    		 Left             Groin:    no  hematoma,     bruit, dressing; C/D/I  		       Pulses:    intact RAD/DP/PT to baseline     A/P:  65 yo M, former heavy smoker, with FHx of CAD (son passed from cardiac arrest), PMHx of HTN, HLD, CAD (s/p BRADEN pRCA 5/6/25 @ [facility], residual disease mLCx),  prediabetes, GERD and vertigo who in light of pt's risk factors, CCS class III anginal symptoms known residual disease, pt now presents to [facility] for staged PCI of LCx.      s/p PCI:     1.	Follow-up with PMD/Cardiologist Tc in 1week.   2.                 Pt given instructions on importance of post groin/radial access site care. .    3. 	Stable for discharge as per attending Dr. Montez after bed rest, pt voids, groin/wrist stable and 30 minutes of ambulation.         Interventional Cardiology  Post  Diagnostic Catheterization  Discharge Note      Patient without complaints. Ambulated and voided without difficulties    Afebrile, VSS    Ext:    		 Left             Groin:    no  hematoma,     bruit, dressing; C/D/I  		       Pulses:    intact RAD/DP/PT to baseline     A/P:  65 yo M, former heavy smoker, with FHx of CAD (son passed from cardiac arrest), PMHx of HTN, HLD, CAD (s/p BRADEN pRCA 5/6/25 @ [facility], residual disease mLCx),  prediabetes, GERD and vertigo who in light of pt's risk factors, CCS class III anginal symptoms known residual disease, pt now presents to [facility] for staged PCI of LCx.      s/p PCI: LAD- MLI, mLCx 70% FFR 0.83, mRCA patent stent with mild ISR FFR 0.86. LFA PC (Tc/Reynaldo)    1.	Follow-up with PMD/Cardiologist Tc in 1week.   2.                 Pt given instructions on importance of post groin/radial access site care. .    3. 	Stable for discharge as per attending Dr. Montez after bed rest, pt voids, groin/wrist stable and 30 minutes of ambulation.

## 2025-07-16 DIAGNOSIS — I25.110 ATHEROSCLEROTIC HEART DISEASE OF NATIVE CORONARY ARTERY WITH UNSTABLE ANGINA PECTORIS: ICD-10-CM

## 2025-09-10 ENCOUNTER — APPOINTMENT (OUTPATIENT)
Dept: HEART AND VASCULAR | Facility: CLINIC | Age: 65
End: 2025-09-10
Payer: COMMERCIAL

## 2025-09-10 VITALS
WEIGHT: 197 LBS | BODY MASS INDEX: 29.86 KG/M2 | DIASTOLIC BLOOD PRESSURE: 86 MMHG | HEIGHT: 68 IN | HEART RATE: 87 BPM | SYSTOLIC BLOOD PRESSURE: 148 MMHG | OXYGEN SATURATION: 98 % | TEMPERATURE: 97.6 F

## 2025-09-10 DIAGNOSIS — I10 ESSENTIAL (PRIMARY) HYPERTENSION: ICD-10-CM

## 2025-09-10 DIAGNOSIS — I25.119 ATHEROSCLEROTIC HEART DISEASE OF NATIVE CORONARY ARTERY WITH UNSPECIFIED ANGINA PECTORIS: ICD-10-CM

## 2025-09-10 DIAGNOSIS — R06.09 OTHER FORMS OF DYSPNEA: ICD-10-CM

## 2025-09-10 PROCEDURE — 99214 OFFICE O/P EST MOD 30 MIN: CPT
